# Patient Record
Sex: FEMALE | Race: WHITE | NOT HISPANIC OR LATINO | Employment: OTHER | URBAN - METROPOLITAN AREA
[De-identification: names, ages, dates, MRNs, and addresses within clinical notes are randomized per-mention and may not be internally consistent; named-entity substitution may affect disease eponyms.]

---

## 2019-12-10 ENCOUNTER — RECORDS - HEALTHEAST (OUTPATIENT)
Dept: LAB | Facility: HOSPITAL | Age: 29
End: 2019-12-10

## 2019-12-11 LAB
GAMMA INTERFERON BACKGROUND BLD IA-ACNC: 0.09 IU/ML
M TB IFN-G BLD-IMP: NEGATIVE
MITOGEN IGNF BCKGRD COR BLD-ACNC: 0.02 IU/ML
MITOGEN IGNF BCKGRD COR BLD-ACNC: 0.03 IU/ML
QTF INTERPRETATION: NORMAL
QTF MITOGEN - NIL: 9.52 IU/ML

## 2020-03-04 ENCOUNTER — OFFICE VISIT - HEALTHEAST (OUTPATIENT)
Dept: FAMILY MEDICINE | Facility: CLINIC | Age: 30
End: 2020-03-04

## 2020-03-04 DIAGNOSIS — Z00.00 ROUTINE MEDICAL EXAM: ICD-10-CM

## 2020-03-04 DIAGNOSIS — Z23 NEED FOR VACCINATION: ICD-10-CM

## 2020-03-04 ASSESSMENT — MIFFLIN-ST. JEOR: SCORE: 1459.56

## 2021-06-04 VITALS
BODY MASS INDEX: 27.42 KG/M2 | WEIGHT: 164.6 LBS | SYSTOLIC BLOOD PRESSURE: 102 MMHG | RESPIRATION RATE: 16 BRPM | DIASTOLIC BLOOD PRESSURE: 60 MMHG | HEART RATE: 60 BPM | HEIGHT: 65 IN

## 2021-06-06 NOTE — PROGRESS NOTES
Assessment:     1. Routine medical exam     2. Need for vaccination  Tdap vaccine,  8yo or older,  IM        Plan:      I recommended she eat a healthy diet and exercise on a regular basis. Will return for pap smear when not menstruating. Tetanus updated today. Otherwise healthy.       Subjective:     Isadora Hickman is a 29 y.o. female who presents for an annual exam.  This is a new patient to the clinic who presents today for an annual exam.  She describes her self is an otherwise healthy 29-year-old female without any chronic medical conditions.  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  She is a non-smoker.  She takes no medications regularly.  She is recently been  this past fall.  She is discussing possible conception in the next couple of years.  She occasionally reports some tenderness in her right breast but denies any currently.  She reports no other changes and has no prior abnormal breast history.  Does no family history of any breast cancer or breast abnormalities.    The patient reports that there is not domestic violence in her life.     Healthy Habits:   Regular Exercise: Yes  Sunscreen Use: Yes  Healthy Diet: Yes  Dental Visits Regularly: Yes  Sexually active: Yes  Colonoscopy: N/A  Prevention of Osteoporosis: Yes  Last Dexa: N/A    Immunization History   Administered Date(s) Administered     DTP 1991, 1991, 1991, 09/22/1992, 10/07/1996     HPV Quadrivalent 12/04/2014, 01/26/2015, 12/29/2015     Hep A, Adult IM (19yr & older) 08/03/2010, 07/09/2012     Hep A, historic 08/03/2010, 07/09/2012     Hep B, Adult 12/10/2019     Influenza, Seasonal, Inj PF IIV3 09/25/2013, 10/30/2014, 09/28/2015, 09/26/2016, 10/21/2017, 10/02/2018     Influenza, inj, historic,unspecified 10/01/2013, 10/10/2014, 10/01/2019     MMR 08/21/1992     Meningococcal MCV4O 07/09/2008     Meningococcal MCV4P 07/09/2008     POLIO, Unspecified 1991,  1991, 1992, 10/07/1996     Tdap 2008, 2020     Typhoid, Inj, Inactive 2010     Immunization status: up to date and documented.    Gynecologic History  Patient's last menstrual period was 2020 (exact date).  Contraception: diaphragm  Last Pap: 3 years ago. Results were: normal      OB History    Para Term  AB Living   0 0 0 0 0 0   SAB TAB Ectopic Multiple Live Births   0 0 0 0 0       Current Outpatient Medications   Medication Sig Dispense Refill     ascorbic acid (VITAMIN C ORAL) Take by mouth.       BIOTIN ORAL Take by mouth.       cholecalciferol, vitamin D3, (VITAMIN D3 ORAL) Take by mouth.       diaphragms, wide seal (DIAPHRAGM WIDE SEAL VAGL) Insert into the vagina.       docosahexaenoic acid/epa (FISH OIL ORAL) Take by mouth.       multivitamin (MULTIPLE VITAMIN ORAL) Take by mouth.       No current facility-administered medications for this visit.      History reviewed. No pertinent past medical history.  History reviewed. No pertinent surgical history.  Casein and Cat hair standardized allergenic extract  Family History   Problem Relation Age of Onset     Heart murmur Mother      Hypertension Mother      Arthritis Mother      Heart disease Father      Social History     Socioeconomic History     Marital status:      Spouse name: Not on file     Number of children: Not on file     Years of education: Not on file     Highest education level: Not on file   Occupational History     Not on file   Social Needs     Financial resource strain: Not on file     Food insecurity:     Worry: Not on file     Inability: Not on file     Transportation needs:     Medical: Not on file     Non-medical: Not on file   Tobacco Use     Smoking status: Never Smoker     Smokeless tobacco: Never Used   Substance and Sexual Activity     Alcohol use: Yes     Alcohol/week: 2.0 standard drinks     Types: 2 Glasses of wine per week     Drug use: Never     Sexual activity: Yes      "Partners: Male     Birth control/protection: Diaphragm, Condom   Lifestyle     Physical activity:     Days per week: Not on file     Minutes per session: Not on file     Stress: Not on file   Relationships     Social connections:     Talks on phone: Not on file     Gets together: Not on file     Attends Cheondoism service: Not on file     Active member of club or organization: Not on file     Attends meetings of clubs or organizations: Not on file     Relationship status: Not on file     Intimate partner violence:     Fear of current or ex partner: Not on file     Emotionally abused: Not on file     Physically abused: Not on file     Forced sexual activity: Not on file   Other Topics Concern     Not on file   Social History Narrative     Not on file       Review of Systems  General:  Negative except as noted above  Eyes: Negative except as noted above  Ears/Nose/Throat: Negative except as noted above  Cardiovascular: Negative except as noted above  Respiratory:  Negative except as noted above  Gastrointestinal:  Negative except as noted above  Musculoskeletal:  Negative except as noted above  Skin: Negative except as noted above  Neurologic: Negative except as noted above  Psychiatric: Negative except as noted above  Endocrine: Negative except as noted above  Heme/Lymphatic: Negative except as noted above   Allergic/Immunologic: Negative except as noted above      Objective:      /60 (Patient Site: Left Arm, Patient Position: Sitting, Cuff Size: Adult Regular)   Pulse 60   Resp 16   Ht 5' 4.5\" (1.638 m)   Wt 164 lb 9.6 oz (74.7 kg)   LMP 03/02/2020 (Exact Date)   Breastfeeding No   BMI 27.82 kg/m      Physical Exam:  General Appearance: Alert, cooperative, no distress.  Head: Normocephalic, without obvious abnormality, atraumatic  Eyes: PERRL, conjunctiva/corneas clear, EOM's intact  Ears: Normal TM's and external ear canals, both ears  Nose: Nares normal, septum midline,mucosa normal, no " drainage  Throat: Lips, mucosa, and tongue normal  Neck: Supple, symmetrical, trachea midline, no adenopathy;  thyroid: not enlarged, symmetric, no tenderness/mass/nodules  Back: Symmetric, no curvature, ROM normal, no CVA tenderness  Lungs: Clear to auscultation bilaterally, respirations unlabored  Breasts: No discrete breast masses, but fibrocystic changes throughout, no tenderness, no asymmetry, or  No nipple discharge.  Heart: Regular rate and rhythm, S1 and S2 normal, no murmur, rub, or gallop, Abdomen: Soft, non-tender, bowel sounds active all four quadrants,  no masses, no organomegaly  Pelvic: deferred   Extremities: Extremities normal, atraumatic, no cyanosis or edema  Skin: Skin color, texture, turgor normal, no rashes or lesions  Lymph nodes: Cervical, supraclavicular, and axillary nodes normal  Neurologic: Normal  Psych: Normal affect.  Does not appear anxious or depressed.

## 2024-04-22 ENCOUNTER — VIRTUAL VISIT (OUTPATIENT)
Dept: FAMILY MEDICINE | Facility: CLINIC | Age: 34
End: 2024-04-22
Payer: COMMERCIAL

## 2024-04-22 DIAGNOSIS — Z34.90 SUPERVISION OF NORMAL PREGNANCY: Primary | ICD-10-CM

## 2024-04-22 PROCEDURE — 99207 PR NO CHARGE NURSE ONLY: CPT | Mod: 93

## 2024-04-22 RX ORDER — ASPIRIN 81 MG/1
81 TABLET, CHEWABLE ORAL DAILY
COMMUNITY
End: 2024-06-26

## 2024-04-22 NOTE — PATIENT INSTRUCTIONS
"Weeks 22 to 26 of Your Pregnancy: Care Instructions  Your baby's lungs are getting ready for breathing. Your baby may respond to your voice. Your baby likely turns less, and kicks or jerks more. Jerking may mean that your baby has hiccups.    Think about taking childbirth classes. And start to think about whether you want to have pain medicine during labor.    At your next doctor visit, you may be tested for anemia and for high blood sugar that first occurs during pregnancy (gestational diabetes). These conditions can cause problems for you and your baby.    To ease discomfort, such as back pain    Change your position often. Try not to sit or stand for too long.  Get some exercise. Things like walking or stretching may help.  Try using a heating pad or cold pack.    To ease or reduce swelling in your feet, ankles, hands, and fingers    Take off your rings.  Avoid high-sodium foods, such as potato chips.  Prop up your feet, and sleep with pillows under your feet.  Try to avoid standing for long periods of time.  Do not wear tight shoes.  Wear support stockings.  Kegel exercises to prevent urine from leaking    Squeeze your muscles as if you were trying not to pass gas. Your belly, legs, and buttocks shouldn't move. Hold the squeeze for 3 seconds, then relax for 5 to 10 seconds.    Add 1 second each week until you can squeeze for 10 seconds. Repeat the exercise 10 times a session. Do 3 to 8 sessions a day. If these exercises cause you pain, stop doing them and talk with your doctor.  Follow-up care is a key part of your treatment and safety. Be sure to make and go to all appointments, and call your doctor if you are having problems. It's also a good idea to know your test results and keep a list of the medicines you take.  Where can you learn more?  Go to https://www.healthwise.net/patiented  Enter G264 in the search box to learn more about \"Weeks 22 to 26 of Your Pregnancy: Care Instructions.\"  Current as of: July " 10, 2023               Content Version: 14.0    7089-1254 [employer].   Care instructions adapted under license by your healthcare professional. If you have questions about a medical condition or this instruction, always ask your healthcare professional. [employer] disclaims any warranty or liability for your use of this information.      Learning About Screening for Gestational Diabetes  What is gestational diabetes screening?     Screening for gestational diabetes is a way to look for high blood sugar during pregnancy. You drink some very sweet liquid. Then you have a blood test to see how your body uses sugar (glucose).  How is gestational diabetes screening done?  Screening for gestational diabetes may be done in a couple of ways.  Two-part screening.  Part one (glucose challenge test): A blood sample is taken after you drink a liquid that contains sugar (glucose). You don't need to stop eating or drinking before this test. If the test shows that you don't have a lot of sugar in your blood, you don't have gestational diabetes.  Part two (oral glucose tolerance test, or OGTT): If the first test shows a lot of sugar in your blood, then you may have an OGTT. You can't eat or drink for at least 8 hours before this test. A blood sample is taken, then you drink a sweet liquid. You have more blood tests after 1 to 3 hours. If the OGTT shows that you have a lot of sugar in your blood, you may have gestational diabetes.  One-part screening.  Sometimes doctors use the OGTT on its own. If the test shows that you don't have a lot of sugar in your blood, you don't have gestational diabetes. If you do have a lot of sugar in your blood, you may have the condition.  What are the risks of screening?  Your blood glucose level may drop very low toward the end of the test. If this happens, you may feel weak, hungry, and restless. Tell your doctor if you have these symptoms. The test usually will be  "stopped.  You may vomit after drinking the sweet liquid. If this happens, you may need to take the test at a later time.  Your doctor may do more glucose tests at other times during your pregnancy.  Follow-up care is a key part of your treatment and safety. Be sure to make and go to all appointments, and call your doctor if you are having problems. It's also a good idea to know your test results and keep a list of the medicines you take.  Where can you learn more?  Go to https://www.luxustravel.es.net/patiented  Enter A472 in the search box to learn more about \"Learning About Screening for Gestational Diabetes.\"  Current as of: October 2, 2023               Content Version: 14.0    0944-1823 "[employer].   Care instructions adapted under license by your healthcare professional. If you have questions about a medical condition or this instruction, always ask your healthcare professional. "[employer] disclaims any warranty or liability for your use of this information.      You have been provided the My Labor and Birth Wishes document.  Please review at home and bring to your next prenatal visit. Bring this sheet to the hospital for your birth. Give copies to your care team members and support person.   Additional copies can be found here:  www.[email or web address].[email or web address].pdf  Circumcision in Infants: What to Expect at Home  Your Child's Recovery  After circumcision, your baby's penis may look red and swollen. It may have petroleum jelly and gauze on it. The gauze will likely come off when your baby urinates. Follow your doctor's directions about whether to put clean gauze back on your baby's penis or to leave the gauze off. If you need to remove gauze from the penis, use warm water to soak the gauze and gently loosen it.  The doctor may have used a Plastibell device to do the circumcision. If so, your baby will have a plastic ring around the head of the penis. The ring should fall off by itself in 10 to " 12 days.  A thin, yellow film may form over the area the day after the procedure. This is part of the normal healing process. It should go away in a few days.  Your baby may seem fussy while the area heals. It may hurt for your baby to urinate. This pain often gets better in 3 or 4 days. But it may last for up to 2 weeks.  Even though your baby's penis will likely start to feel better after 3 or 4 days, it may look worse. The penis often starts to look like it's getting better after about 7 to 10 days.  This care sheet gives you a general idea about how long it will take for your child to recover. But each child recovers at a different pace. Follow the steps below to help your child get better as quickly as possible.  How can you care for your child at home?  Activity    Let your baby rest as much as possible. Sleeping will help with recovery.     You can give your baby a sponge bath the day after surgery. Ask your doctor when it is okay to give your baby a bath.   Medicines    Your doctor will tell you if and when your child can restart any medicines. The doctor will also give you instructions about your child taking any new medicines.     Your doctor may recommend giving your baby acetaminophen (Tylenol) to help with pain after the procedure. Be safe with medicines. Give your child medicines exactly as prescribed. Call your doctor if you think your child is having a problem with a medicine.     Do not give your child two or more pain medicines at the same time unless the doctor told you to. Many pain medicines have acetaminophen, which is Tylenol. Too much acetaminophen (Tylenol) can be harmful.   Circumcision care    Always wash your hands before and after touching the circumcision area.     Gently wash your baby's penis with plain, warm water after each diaper change, and pat it dry. Do not use soap. Don't use hydrogen peroxide or alcohol. They can slow healing.     Do not try to remove the film that forms on  "the penis. The film will go away on its own.     Put plenty of petroleum jelly (such as Vaseline) on the circumcision area during each diaper change. This will prevent your baby's penis from sticking to the diaper while it heals.     Fasten your baby's diapers loosely so that there is less pressure on the penis while it heals.   Follow-up care is a key part of your child's treatment and safety. Be sure to make and go to all appointments, and call your doctor if your child is having problems. It's also a good idea to know your child's test results and keep a list of the medicines your child takes.  When should you call for help?   Call your doctor now or seek immediate medical care if:    Your baby has a fever over 100.4 F.     Your baby is extremely fussy or irritable, has a high-pitched cry, or refuses to eat.     Your baby does not have a wet diaper within 12 hours after the circumcision.     You find a spot of bleeding larger than a 2-inch [place] from the incision.     Your baby has signs of infection. Signs may include severe swelling; redness; a red streak on the shaft of the penis; or a thick, yellow discharge.   Watch closely for changes in your child's health, and be sure to contact your doctor if:    A Plastibell device was used for the circumcision and the ring has not fallen off after 10 to 12 days.   Where can you learn more?  Go to https://www.[employer]avingGlobal.net/patiented  Enter S255 in the search box to learn more about \"Circumcision in Infants: What to Expect at Home.\"  Current as of: October 24, 2023               Content Version: 14.0    1329-7158 [employer].   Care instructions adapted under license by your healthcare professional. If you have questions about a medical condition or this instruction, always ask your healthcare professional. [employer] disclaims any warranty or liability for your use of this information.      "

## 2024-04-22 NOTE — PROGRESS NOTES
OB Intake phone visit with verbal patient permission.  Transferring care from [facility] in [place] at 27 weeks.

## 2024-04-30 LAB
ABO/RH(D): NORMAL
ANTIBODY SCREEN: NEGATIVE
SPECIMEN EXPIRATION DATE: NORMAL

## 2024-05-01 ENCOUNTER — HOSPITAL ENCOUNTER (OUTPATIENT)
Dept: ULTRASOUND IMAGING | Facility: CLINIC | Age: 34
Discharge: HOME | End: 2024-05-01
Attending: STUDENT IN AN ORGANIZED HEALTH CARE EDUCATION/TRAINING PROGRAM | Admitting: STUDENT IN AN ORGANIZED HEALTH CARE EDUCATION/TRAINING PROGRAM
Payer: COMMERCIAL

## 2024-05-01 ENCOUNTER — PRENATAL OFFICE VISIT (OUTPATIENT)
Dept: FAMILY MEDICINE | Facility: CLINIC | Age: 34
End: 2024-05-01
Payer: COMMERCIAL

## 2024-05-01 VITALS
DIASTOLIC BLOOD PRESSURE: 78 MMHG | TEMPERATURE: 97.1 F | RESPIRATION RATE: 18 BRPM | OXYGEN SATURATION: 99 % | BODY MASS INDEX: 35.07 KG/M2 | HEART RATE: 88 BPM | WEIGHT: 210.5 LBS | HEIGHT: 65 IN | SYSTOLIC BLOOD PRESSURE: 122 MMHG

## 2024-05-01 DIAGNOSIS — Z86.0100 HISTORY OF COLONIC POLYPS: ICD-10-CM

## 2024-05-01 DIAGNOSIS — O09.293 HISTORY OF MISCARRIAGE, CURRENTLY PREGNANT, THIRD TRIMESTER: ICD-10-CM

## 2024-05-01 DIAGNOSIS — O99.213 MATERNAL OBESITY SYNDROME IN THIRD TRIMESTER: ICD-10-CM

## 2024-05-01 DIAGNOSIS — Z87.59 HISTORY OF GESTATIONAL HYPERTENSION: ICD-10-CM

## 2024-05-01 DIAGNOSIS — O35.BXX1 ECHOGENIC FOCUS OF HEART OF FETUS AFFECTING ANTEPARTUM CARE OF MOTHER, FETUS 1: ICD-10-CM

## 2024-05-01 DIAGNOSIS — Z3A.28 28 WEEKS GESTATION OF PREGNANCY: ICD-10-CM

## 2024-05-01 DIAGNOSIS — O09.893 SUPERVISION OF OTHER HIGH RISK PREGNANCIES, THIRD TRIMESTER: Primary | ICD-10-CM

## 2024-05-01 LAB
ERYTHROCYTE [DISTWIDTH] IN BLOOD BY AUTOMATED COUNT: 14 % (ref 10–15)
FERRITIN SERPL-MCNC: 18 NG/ML (ref 6–175)
GLUCOSE 1H P 50 G GLC PO SERPL-MCNC: 110 MG/DL (ref 70–129)
HBA1C MFR BLD: 5.1 %
HBV SURFACE AG SERPL QL IA: NONREACTIVE
HCT VFR BLD AUTO: 35.3 % (ref 35–47)
HCV AB SERPL QL IA: NONREACTIVE
HGB BLD-MCNC: 11.8 G/DL (ref 11.7–15.7)
HIV 1+2 AB+HIV1 P24 AG SERPL QL IA: NONREACTIVE
HOLD SPECIMEN: NORMAL
MCH RBC QN AUTO: 29.8 PG (ref 26.5–33)
MCHC RBC AUTO-ENTMCNC: 33.4 G/DL (ref 31.5–36.5)
MCV RBC AUTO: 89 FL (ref 78–100)
PLATELET # BLD AUTO: 256 10E3/UL (ref 150–450)
RBC # BLD AUTO: 3.96 10E6/UL (ref 3.8–5.2)
RUBV IGG SERPL QL IA: 3.05 INDEX
RUBV IGG SERPL QL IA: POSITIVE
T PALLIDUM AB SER QL: NONREACTIVE
WBC # BLD AUTO: 10.4 10E3/UL (ref 4–11)

## 2024-05-01 PROCEDURE — 36415 COLL VENOUS BLD VENIPUNCTURE: CPT | Performed by: STUDENT IN AN ORGANIZED HEALTH CARE EDUCATION/TRAINING PROGRAM

## 2024-05-01 PROCEDURE — 86901 BLOOD TYPING SEROLOGIC RH(D): CPT | Performed by: STUDENT IN AN ORGANIZED HEALTH CARE EDUCATION/TRAINING PROGRAM

## 2024-05-01 PROCEDURE — 99203 OFFICE O/P NEW LOW 30 MIN: CPT | Performed by: STUDENT IN AN ORGANIZED HEALTH CARE EDUCATION/TRAINING PROGRAM

## 2024-05-01 PROCEDURE — 86780 TREPONEMA PALLIDUM: CPT | Performed by: STUDENT IN AN ORGANIZED HEALTH CARE EDUCATION/TRAINING PROGRAM

## 2024-05-01 PROCEDURE — 85027 COMPLETE CBC AUTOMATED: CPT | Performed by: STUDENT IN AN ORGANIZED HEALTH CARE EDUCATION/TRAINING PROGRAM

## 2024-05-01 PROCEDURE — 76816 OB US FOLLOW-UP PER FETUS: CPT

## 2024-05-01 PROCEDURE — 86850 RBC ANTIBODY SCREEN: CPT | Performed by: STUDENT IN AN ORGANIZED HEALTH CARE EDUCATION/TRAINING PROGRAM

## 2024-05-01 PROCEDURE — 83036 HEMOGLOBIN GLYCOSYLATED A1C: CPT | Performed by: STUDENT IN AN ORGANIZED HEALTH CARE EDUCATION/TRAINING PROGRAM

## 2024-05-01 PROCEDURE — 87389 HIV-1 AG W/HIV-1&-2 AB AG IA: CPT | Performed by: STUDENT IN AN ORGANIZED HEALTH CARE EDUCATION/TRAINING PROGRAM

## 2024-05-01 PROCEDURE — 82950 GLUCOSE TEST: CPT | Performed by: STUDENT IN AN ORGANIZED HEALTH CARE EDUCATION/TRAINING PROGRAM

## 2024-05-01 PROCEDURE — 86900 BLOOD TYPING SEROLOGIC ABO: CPT | Performed by: STUDENT IN AN ORGANIZED HEALTH CARE EDUCATION/TRAINING PROGRAM

## 2024-05-01 PROCEDURE — 82728 ASSAY OF FERRITIN: CPT | Performed by: STUDENT IN AN ORGANIZED HEALTH CARE EDUCATION/TRAINING PROGRAM

## 2024-05-01 PROCEDURE — 86762 RUBELLA ANTIBODY: CPT | Performed by: STUDENT IN AN ORGANIZED HEALTH CARE EDUCATION/TRAINING PROGRAM

## 2024-05-01 PROCEDURE — 86803 HEPATITIS C AB TEST: CPT | Performed by: STUDENT IN AN ORGANIZED HEALTH CARE EDUCATION/TRAINING PROGRAM

## 2024-05-01 PROCEDURE — 87340 HEPATITIS B SURFACE AG IA: CPT | Performed by: STUDENT IN AN ORGANIZED HEALTH CARE EDUCATION/TRAINING PROGRAM

## 2024-05-01 PROCEDURE — 87086 URINE CULTURE/COLONY COUNT: CPT | Performed by: STUDENT IN AN ORGANIZED HEALTH CARE EDUCATION/TRAINING PROGRAM

## 2024-05-01 ASSESSMENT — PAIN SCALES - GENERAL: PAINLEVEL: NO PAIN (0)
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
## 2024-05-02 PROBLEM — O99.210 OBESITY IN PREGNANCY: Status: ACTIVE | Noted: 2023-11-29

## 2024-05-02 PROBLEM — Z87.59 HISTORY OF GESTATIONAL HYPERTENSION: Status: ACTIVE | Noted: 2023-11-29

## 2024-05-02 LAB — BACTERIA UR CULT: NORMAL

## 2024-05-12 ENCOUNTER — HEALTH MAINTENANCE LETTER (OUTPATIENT)
Age: 34
End: 2024-05-12

## 2024-05-17 ENCOUNTER — PRENATAL OFFICE VISIT (OUTPATIENT)
Dept: FAMILY MEDICINE | Facility: CLINIC | Age: 34
End: 2024-05-17
Payer: COMMERCIAL

## 2024-05-17 VITALS
SYSTOLIC BLOOD PRESSURE: 110 MMHG | DIASTOLIC BLOOD PRESSURE: 70 MMHG | RESPIRATION RATE: 20 BRPM | HEART RATE: 96 BPM | OXYGEN SATURATION: 98 % | BODY MASS INDEX: 34.86 KG/M2 | TEMPERATURE: 97.5 F | HEIGHT: 65 IN | WEIGHT: 209.25 LBS

## 2024-05-17 DIAGNOSIS — Z86.0100 HISTORY OF COLONIC POLYPS: ICD-10-CM

## 2024-05-17 DIAGNOSIS — O09.893 SUPERVISION OF OTHER HIGH RISK PREGNANCIES, THIRD TRIMESTER: Primary | ICD-10-CM

## 2024-05-17 DIAGNOSIS — O99.213 MATERNAL OBESITY SYNDROME IN THIRD TRIMESTER: ICD-10-CM

## 2024-05-17 DIAGNOSIS — O35.BXX1 ECHOGENIC FOCUS OF HEART OF FETUS AFFECTING ANTEPARTUM CARE OF MOTHER, FETUS 1: ICD-10-CM

## 2024-05-17 DIAGNOSIS — Z87.59 HISTORY OF GESTATIONAL HYPERTENSION: ICD-10-CM

## 2024-05-17 PROCEDURE — 99207 PR COMPLICATED OB VISIT: CPT | Performed by: STUDENT IN AN ORGANIZED HEALTH CARE EDUCATION/TRAINING PROGRAM

## 2024-05-17 ASSESSMENT — PAIN SCALES - GENERAL: PAINLEVEL: NO PAIN (0)

## 2024-05-17 NOTE — PROGRESS NOTES
Concerns: ***  {[id number]}  Discussed kick counts and fetal movement.  Discussed PTL, PROM, and when to call or come in.  RTC 2 weeks.    Rhianna Cornell, DO

## 2024-05-17 NOTE — PROGRESS NOTES
"Isadora Hickman 34 year old  @ 30w5d with an Estimated Date of Delivery of 2024, by Last Menstrual Period here for OB visit.  Baby active, no LOF or VB. No concerns.   Revisited discussed regarding Fetal ecogenic focus. NO concern noted per prior documentation. Patient also not concerned, her knowledge suggested was not a huge issues. Not inclined to worry at this point.   Monitoring BP's at home due to history, all wnl. No concerns.  Discussed typical  screening for gHTN and obesity.  Not concerned for additional  screening at this time.     Prenatal Labs:   Lab Results   Component Value Date    AS Negative 2024    HEPBANG Nonreactive 2024    HGB 11.8 2024      /70   Pulse 96   Temp 97.5  F (36.4  C) (Temporal)   Resp 20   Ht 1.65 m (5' 4.96\")   Wt 94.9 kg (209 lb 4 oz)   LMP 10/15/2023   SpO2 98%   BMI 34.86 kg/m     Gen - well appearing  See flowsheet      A/P   Doing well.  No concerns today.  Prenatal flowsheet information is reviewed.  Discussed PTL, PROM, and when to call or come in.  Discussed kick counts and fetal movement.  Reportable signs and symptoms discussed.   labor/PROM discussed  RTC 2 wks    Pregnancy risks              1. hx of ghtn: growth US q4 wks. Had one at 28, so can cont with 32 and 36. Consider BPP starting weekly at 32 weeks.              2. Obesity:  BMI 32.82 pregravid.  Goal for weight gain during the pregnancy is 11-20 lbs. Patient reports at 10 lbs total gain today.              3. Fetal Echogenic Foci: If genetic screen normal no further studies. If no genetic screens rec NIPS or Quad screen as finding infers increased risk for Down Syndrome.              4. Anemia of pregnancy: history. On PNV and multiple other supplements, Hgb wnl, Ferritin low normal.   5. Large rectal polyp/hx rectal bleeding: colonoscopy  w/rec for q3 year repeats, needs one  after pregnancy.      Prenatal care plan  - VILMA 24    "           - pregravid BMI: 32.82              - prior deliveries: 1 , 1 miscarraige 11 weeks (8 week demise)  - OB Labs of concern: none   - Rh- A POS              - Labor pain management:  no              - Ped:  Will discuss at later appt              - Circumcision if boy: No              - Birth control: No              - Breast feeding:  Yes              - Covid 19 vaccine:  Not Done              - influenza vaccine: Not Done              - Tdap Not Done   - RSV Not Done      Rhianna Cornell,

## 2024-05-29 ENCOUNTER — PRENATAL OFFICE VISIT (OUTPATIENT)
Dept: FAMILY MEDICINE | Facility: CLINIC | Age: 34
End: 2024-05-29
Payer: COMMERCIAL

## 2024-05-29 VITALS
DIASTOLIC BLOOD PRESSURE: 70 MMHG | OXYGEN SATURATION: 99 % | SYSTOLIC BLOOD PRESSURE: 108 MMHG | HEIGHT: 65 IN | RESPIRATION RATE: 16 BRPM | BODY MASS INDEX: 35.49 KG/M2 | WEIGHT: 213 LBS | HEART RATE: 94 BPM | TEMPERATURE: 97.1 F

## 2024-05-29 DIAGNOSIS — Z34.90 NORMAL PREGNANCY, ANTEPARTUM: Primary | ICD-10-CM

## 2024-05-29 PROCEDURE — 99207 PR PRENATAL VISIT: CPT | Performed by: FAMILY MEDICINE

## 2024-05-29 ASSESSMENT — PAIN SCALES - GENERAL: PAINLEVEL: NO PAIN (0)

## 2024-05-29 NOTE — PROGRESS NOTES
Concerns:    Doing well.  No concerns today.  No vaginal bleeding, LOF.  No contractions.  Discussed kick counts and fetal movement.  Discussed PTL, PROM, and when to call or come in.  RTC 2 weeks.    Ghulam Bianchi MD

## 2024-05-29 NOTE — PATIENT INSTRUCTIONS
"Weeks 32 to 34 of Your Pregnancy: Care Instructions    Decide whether you want to bank or donate your baby's umbilical cord blood. If you want to save this blood, you have to arrange for it ahead of time.    Decide about circumcision. Personal, Yazidi, or cultural beliefs may play a role in your decision. You get to decide what you want for your baby.    Learn how to ease hemorrhoids.    Get more liquids, fruits, vegetables, and fiber in your diet.  Avoid sitting for too long.  Clean yourself with moist toilet paper. Or try witch hazel pads.  Try ice packs or warm sitz baths for discomfort.  Use hydrocortisone cream for pain or itching.  Ask your doctor about stool softeners.    Consider the benefits of breastfeeding.    It reduces your baby's risk of sudden infant death syndrome (SIDS).   babies are less likely to get certain infections. And they're less likely to be obese or get diabetes later in life.  It can lower your risk of breast and ovarian cancers and osteoporosis.  It saves you money.  Follow-up care is a key part of your treatment and safety. Be sure to make and go to all appointments, and call your doctor if you are having problems. It's also a good idea to know your test results and keep a list of the medicines you take.  Where can you learn more?  Go to https://www.[employer].net/patiented  Enter X711 in the search box to learn more about \"Weeks 32 to 34 of Your Pregnancy: Care Instructions.\"  Current as of: July 10, 2023               Content Version: 14.0    7900-2835 [employer].   Care instructions adapted under license by your healthcare professional. If you have questions about a medical condition or this instruction, always ask your healthcare professional. [employer], [employer] disclaims any warranty or liability for your use of this information.      "

## 2024-05-30 ENCOUNTER — TELEPHONE (OUTPATIENT)
Dept: FAMILY MEDICINE | Facility: CLINIC | Age: 34
End: 2024-05-30
Payer: COMMERCIAL

## 2024-06-12 ENCOUNTER — PRENATAL OFFICE VISIT (OUTPATIENT)
Dept: FAMILY MEDICINE | Facility: CLINIC | Age: 34
End: 2024-06-12
Payer: COMMERCIAL

## 2024-06-12 VITALS
HEART RATE: 82 BPM | SYSTOLIC BLOOD PRESSURE: 112 MMHG | RESPIRATION RATE: 16 BRPM | DIASTOLIC BLOOD PRESSURE: 70 MMHG | BODY MASS INDEX: 35.49 KG/M2 | OXYGEN SATURATION: 98 % | TEMPERATURE: 97.9 F | WEIGHT: 213 LBS | HEIGHT: 65 IN

## 2024-06-12 DIAGNOSIS — O99.210 OBESITY IN PREGNANCY: ICD-10-CM

## 2024-06-12 DIAGNOSIS — Z87.59 HISTORY OF GESTATIONAL HYPERTENSION: ICD-10-CM

## 2024-06-12 DIAGNOSIS — O09.893 SUPERVISION OF OTHER HIGH RISK PREGNANCIES, THIRD TRIMESTER: Primary | ICD-10-CM

## 2024-06-12 PROCEDURE — 99207 PR COMPLICATED OB VISIT: CPT | Performed by: FAMILY MEDICINE

## 2024-06-12 ASSESSMENT — PAIN SCALES - GENERAL: PAINLEVEL: NO PAIN (0)

## 2024-06-13 NOTE — PROGRESS NOTES
Doing well overall.  No bleeding, no regular ctx.   She is questioning fetal position. Would like to avoid CS for breech but baby was breech on last sono.   On exam today, baby feels possibly cephalic but difficult to be certain. Advised that it's too early to make decisions based on position as baby still has plenty of time to turn.  However we briefly discussed ECV.  She asked about breech vaginal delivery and I advised her that we do not prefer to do that except when unavoidable.  Next visit will be in 2 weeks and she will have GBS and cervix check at that time.  Also because she is measuring small I ordered a repeat growth ultrasound at that time and will definitely be able to tell position at that time.  Discussed  labor.   Discussed warning signs for preeclampsia.  Will f/u in 2 week(s) or sooner with any concern for decreased fetal movement, vaginal bleeding, fluid leak, headache, vision change, severe nausea or vomiting, abdominal pain, increased edema or other concerns.   Carrol Weathers MD

## 2024-06-26 ENCOUNTER — PRENATAL OFFICE VISIT (OUTPATIENT)
Dept: FAMILY MEDICINE | Facility: CLINIC | Age: 34
End: 2024-06-26
Payer: COMMERCIAL

## 2024-06-26 VITALS
SYSTOLIC BLOOD PRESSURE: 112 MMHG | HEIGHT: 65 IN | WEIGHT: 213.2 LBS | TEMPERATURE: 97.3 F | HEART RATE: 88 BPM | DIASTOLIC BLOOD PRESSURE: 74 MMHG | RESPIRATION RATE: 16 BRPM | BODY MASS INDEX: 35.52 KG/M2 | OXYGEN SATURATION: 99 %

## 2024-06-26 DIAGNOSIS — Z87.59 HISTORY OF GESTATIONAL HYPERTENSION: ICD-10-CM

## 2024-06-26 DIAGNOSIS — Z34.83 ENCOUNTER FOR SUPERVISION OF OTHER NORMAL PREGNANCY IN THIRD TRIMESTER: Primary | ICD-10-CM

## 2024-06-26 DIAGNOSIS — O99.210 OBESITY IN PREGNANCY: ICD-10-CM

## 2024-06-26 PROBLEM — O13.9 GESTATIONAL HYPERTENSION: Status: ACTIVE | Noted: 2022-07-01

## 2024-06-26 PROBLEM — F32.0 MAJOR DEPRESSIVE DISORDER, SINGLE EPISODE, MILD (H): Status: ACTIVE | Noted: 2020-11-21

## 2024-06-26 PROCEDURE — 87653 STREP B DNA AMP PROBE: CPT | Performed by: FAMILY MEDICINE

## 2024-06-26 PROCEDURE — 99207 PR PRENATAL VISIT: CPT | Performed by: FAMILY MEDICINE

## 2024-06-26 ASSESSMENT — PAIN SCALES - GENERAL: PAINLEVEL: NO PAIN (0)

## 2024-06-26 NOTE — PROGRESS NOTES
"Reviewed the medical record.  Transfer care to our facility at about 28 weeks gestation.  Good prenatal care, no complication with this pregnancy.  Was on aspirin due to history of gestational hypertension.  Her blood pressure has been stable and normal.  Glucose intolerance test was normal.  Prenatal lab results reviewed - all were normal.  Blood type is A+.  GBS has not checked yet.  Growth ultrasound at that at 29 weeks showed appropriate growth with the estimated fetal weight weight 47 percentile.  There was a small and likely echogenic intracardiac focus noted - unchanged from previous-ultrasound      Overall, doing well.  No concerns today.   Normal fetal movement   Rare, sporadic, none painful contractions with no increased pelvic pressure   - No cramping  No vaginal bleeding, abnormal discharge, or leakage.  No HA, abdominal pain, N/V, visual changes, or leg swelling  No mental health or safety concern  No heartburn, tolerating oral intake well.   -Been drinking a lot of water.  Stopped taking aspirin as instructed by Dr. Bianchi last week.  Check her blood pressure at home occasionally and they have been normal.    Prenatal flowsheet information is reviewed.      /74   Pulse 88   Temp 97.3  F (36.3  C) (Temporal)   Resp 16   Ht 1.648 m (5' 4.9\")   Wt 96.7 kg (213 lb 3.2 oz)   LMP 10/15/2023   SpO2 99%   BMI 35.59 kg/m          Cervix: Deferred  Cephalic presentation appreciated on the exam today.  Continue with prenatal vitamin and multivitamin   Recommend to consider restarting the aspirin to 38-39 weeks.    Encouraged to drink a lot of water  Normal activity as tolerated.  Discussed signs of labor and symptoms to call in or come in.  Intrapartum care discussed   -Consider epidural for pain management   -Discussed about indication for augmentation   -Discussed about delayed cord clamping  Discussed about indication for induction     -Discussed briefly about the potential complication " associated with induction  Discussed kick counts and fetal movement.  Labor and reportable symptoms discussed.  Preeclamptic symptoms discussed as well  GBS obtained today.  Tdap:  not done for this pregnancy yet; recommend to offer her again at the next visit  RTC 1 week, earlier as needed.      MD Fabiano Sánchez Mai, Mai, MD

## 2024-06-26 NOTE — PATIENT INSTRUCTIONS
"Week 37 of Your Pregnancy: Care Instructions    Most babies are born between 37 and 40 weeks.    This is a good time to pack a bag to take with you to the birth. Then it will be ready to go when you are.    Learn about breastfeeding.  For example, find out about ways to hold your baby to make breastfeeding easier. And think about learning how to pump and store milk.     Know that crying is normal.  It's common for babies to cry 1 to 3 hours a day. Some cry more, and some cry less.     Learn why babies cry.  They may be hungry; have gas; need a diaper change; or feel cold, warm, tired, lonely, or tense. Sometimes they cry for unknown reasons.     Think about what will help you stay calm when your baby cries.  Taking slow, deep breaths can help. So can taking a break. It's okay to put your baby somewhere safe (like their crib) and walk away for a few minutes.     Learn about safe sleep for your baby.  Always put your baby to sleep on their back. Place them alone in a crib or bassinet with a firm, flat surface. Keep soft items like stuffed animals out of the crib.     Learn what to expect with  poop.  Your baby will have their own bowel patterns. Some babies have several bowel movements a day. Some have fewer.     Know that  babies will often have loose, yellow bowel movements.  Formula-fed babies have more formed stools. If your baby's poop looks like pellets, your baby is constipated.   Follow-up care is a key part of your treatment and safety. Be sure to make and go to all appointments, and call your doctor if you are having problems. It's also a good idea to know your test results and keep a list of the medicines you take.  Where can you learn more?  Go to https://www.[employer].net/patiented  Enter N257 in the search box to learn more about \"Week 37 of Your Pregnancy: Care Instructions.\"  Current as of: July 10, 2023               Content Version: 14.0    2159-2503 [employer].   Care " instructions adapted under license by your healthcare professional. If you have questions about a medical condition or this instruction, always ask your healthcare professional. Healthwise[employer] disclaims any warranty or liability for your use of this information.

## 2024-06-27 LAB — GP B STREP DNA SPEC QL NAA+PROBE: NEGATIVE

## 2024-06-30 PROBLEM — O13.9 GESTATIONAL HYPERTENSION: Status: RESOLVED | Noted: 2022-07-01 | Resolved: 2024-06-30

## 2024-07-02 ENCOUNTER — PRENATAL OFFICE VISIT (OUTPATIENT)
Dept: FAMILY MEDICINE | Facility: CLINIC | Age: 34
End: 2024-07-02
Payer: COMMERCIAL

## 2024-07-02 VITALS
HEART RATE: 94 BPM | HEIGHT: 65 IN | BODY MASS INDEX: 35.82 KG/M2 | RESPIRATION RATE: 18 BRPM | SYSTOLIC BLOOD PRESSURE: 114 MMHG | DIASTOLIC BLOOD PRESSURE: 72 MMHG | TEMPERATURE: 97.7 F | OXYGEN SATURATION: 99 % | WEIGHT: 215 LBS

## 2024-07-02 DIAGNOSIS — O09.893 SUPERVISION OF OTHER HIGH RISK PREGNANCIES, THIRD TRIMESTER: Primary | ICD-10-CM

## 2024-07-02 PROCEDURE — 99207 PR PRENATAL VISIT: CPT | Performed by: FAMILY MEDICINE

## 2024-07-02 ASSESSMENT — PAIN SCALES - GENERAL: PAINLEVEL: NO PAIN (0)

## 2024-07-02 NOTE — PROGRESS NOTES
Patient is a 34-year-old -0-1-1 currently at 37 weeks and 2 days gestational age here for follow-up.  No signs or symptoms of she has having some Jameel Posey contractions.  Denies any vision changes/right upper quadrant pain/headaches.  Minimal swelling only in the afternoon/evening.  Home blood pressure monitoring within normal limits    Pregnancy complications:  -Transfer of care at 28 weeks  -History of Pre-E required induction at 37 weeks. (Note: Induction progressed very rapidly under 24 hours duration)  -Fetal growth: On track at 47th percentile on 2024  -Low ferritin: On iron supplementation   -H&H 11.8 and 35.3 on 2024 asymptomatic at this time  - GBS negative 24  - Tdap: declined (last immunized 3/4/2020)  - Postpartum birth control method: undecided        Concerns: None  No vaginal bleeding, LOF, contractions.  No HA, RUQ pain, N/V, visual changes.  Discussed signs of labor and when to call or come in.  Discussed kick counts and fetal movement.  Reportable signs and symptoms discussed.  Routine PE precautions discussed  Labor precautions discussed.  RTC 1 week.    Sandy Christine MD

## 2024-07-10 ENCOUNTER — PRENATAL OFFICE VISIT (OUTPATIENT)
Dept: FAMILY MEDICINE | Facility: CLINIC | Age: 34
End: 2024-07-10
Payer: COMMERCIAL

## 2024-07-10 VITALS
HEIGHT: 65 IN | SYSTOLIC BLOOD PRESSURE: 106 MMHG | BODY MASS INDEX: 35.92 KG/M2 | DIASTOLIC BLOOD PRESSURE: 58 MMHG | TEMPERATURE: 97.7 F | HEART RATE: 92 BPM | WEIGHT: 215.6 LBS | RESPIRATION RATE: 18 BRPM | OXYGEN SATURATION: 97 %

## 2024-07-10 DIAGNOSIS — O09.90 HIGH-RISK PREGNANCY SUPERVISION, UNSPECIFIED TRIMESTER: Primary | ICD-10-CM

## 2024-07-10 PROCEDURE — 59425 ANTEPARTUM CARE ONLY: CPT | Performed by: FAMILY MEDICINE

## 2024-07-10 PROCEDURE — 99207 PR PRENATAL VISIT: CPT | Performed by: FAMILY MEDICINE

## 2024-07-10 ASSESSMENT — PATIENT HEALTH QUESTIONNAIRE - PHQ9: SUM OF ALL RESPONSES TO PHQ QUESTIONS 1-9: 1

## 2024-07-10 ASSESSMENT — PAIN SCALES - GENERAL: PAINLEVEL: NO PAIN (0)

## 2024-07-10 NOTE — PROGRESS NOTES
Concerns:   Doing well.  No concerns today.  No vaginal bleeding, LOF, contractions.  No HA, RUQ pain, N/V, visual changes.  Labor precautions discussed.  Declines cervix check  Preferring to wait till 41w  Has     RTC 1 week.    Pregnancy considerations:  -Transfer of care at 28 weeks from [facility]  -History of Pre-E required induction at 37 weeks. (Note: Induction progressed very rapidly under 24 hours duration)  -Fetal growth: On track at 47th percentile on 5/1/2024  -Low ferritin: On iron supplementation              -H&H 11.8 and 35.3 on 5/1/2024 asymptomatic at this time  - GBS negative 6/26/24  - Tdap: declined (last immunized 3/4/2020)  - Postpartum birth control method: undecided     Ghulam Bianchi MD

## 2024-07-10 NOTE — PATIENT INSTRUCTIONS
Week 38 of Your Pregnancy: Care Instructions  Believe it or not, your baby is almost here. You may notice how your baby responds to sounds, warmth, cold, and light. You may even know what kind of music your baby likes.    Even if you expect a vaginal birth, it's a good idea to learn about  section ().  is the delivery of a baby through a cut (incision) in your belly. It's a major surgery, so it has more risks than a vaginal birth.    During the first 2 weeks after the birth, limit visitors. Don't allow visitors who have colds or infections. Ask visitors to wash their hands before they touch your baby. And never let anyone smoke around your baby.    Know about unplanned C-sections.  Reasons for an unplanned  include labor that slows or stops, signs of distress in your baby, and high blood pressure or other problems for you.     Know about planned C-sections.  If your baby isn't in a head-down position for delivery (breech position), your doctor may plan a . Or you may have a planned  if you're having twins or more.     Get as much help as you can while you're in the hospital.  You can learn about feeding, diapering, and bathing your baby.     Talk to your doctor or midwife about how to care for the umbilical cord stump.  If your baby will be circumcised, also ask about how to care for that.     Ask friends or family for help, as you need it.  If you can, have another adult in your home for at least 2 or 3 days after the birth.     Try to nap when your baby naps.  This may be your best chance to get some sleep.     Watch for changes in your mental health.  For the first 1 to 2 weeks after birth, it's common to cry or feel sad or irritable. If these feelings last for more than 2 weeks, you may have postpartum depression. Ask your doctor for help. Postpartum depression can be treated.   Follow-up care is a key part of your treatment and safety. Be sure to make and go  "to all appointments, and call your doctor if you are having problems. It's also a good idea to know your test results and keep a list of the medicines you take.  Where can you learn more?  Go to https://www.[employer].net/patiented  Enter B044 in the search box to learn more about \"Week 38 of Your Pregnancy: Care Instructions.\"  Current as of: July 10, 2023               Content Version: 14.0    1022-4760 [employer].   Care instructions adapted under license by your healthcare professional. If you have questions about a medical condition or this instruction, always ask your healthcare professional. [employer] disclaims any warranty or liability for your use of this information.      "

## 2025-05-18 ENCOUNTER — HEALTH MAINTENANCE LETTER (OUTPATIENT)
Age: 35
End: 2025-05-18